# Patient Record
Sex: MALE | Race: WHITE | NOT HISPANIC OR LATINO
[De-identification: names, ages, dates, MRNs, and addresses within clinical notes are randomized per-mention and may not be internally consistent; named-entity substitution may affect disease eponyms.]

---

## 2021-10-27 PROBLEM — Z00.00 ENCOUNTER FOR PREVENTIVE HEALTH EXAMINATION: Status: ACTIVE | Noted: 2021-10-27

## 2021-11-01 ENCOUNTER — APPOINTMENT (OUTPATIENT)
Dept: COLORECTAL SURGERY | Facility: CLINIC | Age: 41
End: 2021-11-01
Payer: MEDICAID

## 2021-11-01 ENCOUNTER — NON-APPOINTMENT (OUTPATIENT)
Age: 41
End: 2021-11-01

## 2021-11-01 VITALS
HEIGHT: 67 IN | WEIGHT: 116 LBS | SYSTOLIC BLOOD PRESSURE: 121 MMHG | BODY MASS INDEX: 18.21 KG/M2 | DIASTOLIC BLOOD PRESSURE: 75 MMHG | TEMPERATURE: 99.3 F | HEART RATE: 80 BPM

## 2021-11-01 DIAGNOSIS — K62.1 RECTAL POLYP: ICD-10-CM

## 2021-11-01 PROCEDURE — 45330 DIAGNOSTIC SIGMOIDOSCOPY: CPT

## 2021-11-01 PROCEDURE — 99204 OFFICE O/P NEW MOD 45 MIN: CPT | Mod: 25

## 2021-11-01 NOTE — ASSESSMENT
[FreeTextEntry1] : Rectal polyp–Crohn's disease.\par \par I reviewed with the patient and his father that the findings on examination are consistent with a potential pseudopolyp/hyperplastic polyp of the rectum in the setting and background of chronic Crohn's disease.  However given the longstanding history of Crohn's disease and the large irregular nature of this polypoid mass I recommended resection.  Resection would be performed to exclude underlying potential risk of malignancy/adenomatous changes.  The polyp will be removed by coloscopic means given his scarring the rectum that preclude transanal approach via standard operative instrumentation.  Risks, benefits and alternatives reviewed.  Questions answered

## 2021-11-01 NOTE — PHYSICAL EXAM
[Abdomen Masses] : No abdominal masses [Abdomen Tenderness] : ~T No ~M abdominal tenderness [No HSM] : no hepatosplenomegaly [Tight] : was tight [None] : there was no rectal mass  [de-identified] : Circumferential stricturing of the distal rectum.  Able to pass rectal exam but overall scarring appreciable [FreeTextEntry1] : The risks , benefits and alternatives of the procedure were reviewed with the patient. The patient consents to the planned procedure.\par \par The flexible sigmoidoscope was passed through the anus into the rectum. The scope was passed to 354    cm from the anal verge.\par \par The findings revealed:\par Area of scarring in the distal rectum with a large 3 x 3 cm broad-based polypoid lesion.  Smooth.  Secondary small 1 cm polypoid lesion noted in the scarring of the distal rectum just proximal to the dentate line along the opposite wall\par

## 2021-11-01 NOTE — HISTORY OF PRESENT ILLNESS
[FreeTextEntry1] : 42 yo M presents w/ father and family friend (patient care manager at [facility]) for evaluation of rectal polyp, referred by Dr. Cota\par On Apriso 4 tabs daily for Crohns disease\par PSH of partial colon resection 17 years ago (possible sigmoid resection) followed by partial colon resection and small bowel surgery (possible right hemicolectomy) w/ Almas Kizzy at [facility], last occurred 11 years ago. Denies h/o SBO\par \par Last colonoscopy on 10/12/21\par - internal hemorrhoids\par - scarring of the perianal area\par - 4 cm polypoid mass in the rectum, biopsied\par - liquid stool present in the transverse colon, irrigated and suctions\par - anastomotic site at hepatic flexure noted\par \par Pathology\par A.) Rectosigmoid polyp, biopsy: Hyperplastic polyp \par \par CT A/P completed 10/19/21 at [facility]\par - appears to be s/p sigmoid resection and anastomosis. Non enlarged to mildly enlarges LNs seen within the rectosigmoid mesentery including 1.2 x 0.9 cm node. In addition, there is a few mildly enhancing 1.7 x 1.7 cm nodule extending intraluminally from the right anterolateral rectal wall with mild anterior rectal wall thickening.\par - Mild to moderate circumferential mural thickening w/o mucosal hyperenhancement is seen involving the TI suggesting non-active Crohn's disease\par \par Pt reports he is doing well\par Denies fever, unintentional weight loss, abdominal pain, n/v/c/d or rectal bleeding\par c/o fecal urgency for years, denies episodes of incontinence\par \par BH: 2-5 times daily, passes pieces of stool \par Reports adequate dietary fiber and water intake\par Not taking fiber supplements or stool softeners\par Pt avoids dairy and sugar\par Denies [facility] CRC or other members w/ IBD\par Denies ASA/NSAID use

## 2021-11-09 DIAGNOSIS — Z01.812 ENCOUNTER FOR PREPROCEDURAL LABORATORY EXAMINATION: ICD-10-CM

## 2021-11-09 RX ORDER — POLYETHYLENE GLYCOL 3350 AND ELECTROLYTES WITH LEMON FLAVOR 236; 22.74; 6.74; 5.86; 2.97 G/4L; G/4L; G/4L; G/4L; G/4L
236 POWDER, FOR SOLUTION ORAL
Qty: 1 | Refills: 0 | Status: ACTIVE | COMMUNITY
Start: 2021-11-09 | End: 1900-01-01

## 2021-11-23 ENCOUNTER — APPOINTMENT (OUTPATIENT)
Dept: GASTROENTEROLOGY | Facility: HOSPITAL | Age: 41
End: 2021-11-23

## 2021-11-23 ENCOUNTER — RESULT REVIEW (OUTPATIENT)
Age: 41
End: 2021-11-23

## 2021-11-23 ENCOUNTER — OUTPATIENT (OUTPATIENT)
Dept: OUTPATIENT SERVICES | Facility: HOSPITAL | Age: 41
LOS: 1 days | Discharge: HOME | End: 2021-11-23
Payer: MEDICAID

## 2021-11-23 PROCEDURE — 45349 SIGMOIDOSCOPY W/RESECTION: CPT

## 2021-11-23 PROCEDURE — 45346 SIGMOIDOSCOPY W/ABLATION: CPT | Mod: 59

## 2021-11-23 PROCEDURE — 88342 IMHCHEM/IMCYTCHM 1ST ANTB: CPT | Mod: 26

## 2021-11-23 PROCEDURE — C1889: CPT

## 2021-11-23 PROCEDURE — 88305 TISSUE EXAM BY PATHOLOGIST: CPT | Mod: 26

## 2021-11-23 PROCEDURE — 88305 TISSUE EXAM BY PATHOLOGIST: CPT

## 2021-11-23 PROCEDURE — 45331 SIGMOIDOSCOPY AND BIOPSY: CPT

## 2021-11-23 PROCEDURE — 88341 IMHCHEM/IMCYTCHM EA ADD ANTB: CPT

## 2021-11-23 PROCEDURE — 88341 IMHCHEM/IMCYTCHM EA ADD ANTB: CPT | Mod: 26

## 2021-12-01 LAB — SURGICAL PATHOLOGY STUDY: SIGNIFICANT CHANGE UP

## 2021-12-07 ENCOUNTER — NON-APPOINTMENT (OUTPATIENT)
Age: 41
End: 2021-12-07